# Patient Record
Sex: MALE | Race: WHITE | NOT HISPANIC OR LATINO | URBAN - METROPOLITAN AREA
[De-identification: names, ages, dates, MRNs, and addresses within clinical notes are randomized per-mention and may not be internally consistent; named-entity substitution may affect disease eponyms.]

---

## 2017-06-20 ENCOUNTER — EMERGENCY (EMERGENCY)
Facility: HOSPITAL | Age: 47
LOS: 0 days | Discharge: HOME | End: 2017-06-20
Admitting: INTERNAL MEDICINE

## 2017-06-28 DIAGNOSIS — K08.89 OTHER SPECIFIED DISORDERS OF TEETH AND SUPPORTING STRUCTURES: ICD-10-CM

## 2017-06-28 DIAGNOSIS — Z98.890 OTHER SPECIFIED POSTPROCEDURAL STATES: ICD-10-CM

## 2017-06-28 DIAGNOSIS — Z88.0 ALLERGY STATUS TO PENICILLIN: ICD-10-CM

## 2017-06-29 ENCOUNTER — TRANSCRIPTION ENCOUNTER (OUTPATIENT)
Age: 47
End: 2017-06-29

## 2017-11-01 ENCOUNTER — OUTPATIENT (OUTPATIENT)
Dept: OUTPATIENT SERVICES | Facility: HOSPITAL | Age: 47
LOS: 1 days | Discharge: HOME | End: 2017-11-01

## 2017-11-01 DIAGNOSIS — R74.0 NONSPECIFIC ELEVATION OF LEVELS OF TRANSAMINASE AND LACTIC ACID DEHYDROGENASE [LDH]: ICD-10-CM

## 2017-11-01 DIAGNOSIS — E78.00 PURE HYPERCHOLESTEROLEMIA, UNSPECIFIED: ICD-10-CM

## 2017-11-01 DIAGNOSIS — E55.9 VITAMIN D DEFICIENCY, UNSPECIFIED: ICD-10-CM

## 2017-11-01 DIAGNOSIS — G93.3 POSTVIRAL AND RELATED FATIGUE SYNDROMES: ICD-10-CM

## 2017-11-01 DIAGNOSIS — N39.0 URINARY TRACT INFECTION, SITE NOT SPECIFIED: ICD-10-CM

## 2017-11-01 DIAGNOSIS — E11.9 TYPE 2 DIABETES MELLITUS WITHOUT COMPLICATIONS: ICD-10-CM

## 2018-02-07 ENCOUNTER — OUTPATIENT (OUTPATIENT)
Dept: OUTPATIENT SERVICES | Facility: HOSPITAL | Age: 48
LOS: 1 days | Discharge: HOME | End: 2018-02-07

## 2018-02-07 DIAGNOSIS — E55.9 VITAMIN D DEFICIENCY, UNSPECIFIED: ICD-10-CM

## 2018-02-07 DIAGNOSIS — K76.89 OTHER SPECIFIED DISEASES OF LIVER: ICD-10-CM

## 2018-02-07 DIAGNOSIS — E11.9 TYPE 2 DIABETES MELLITUS WITHOUT COMPLICATIONS: ICD-10-CM

## 2018-02-07 DIAGNOSIS — N39.0 URINARY TRACT INFECTION, SITE NOT SPECIFIED: ICD-10-CM

## 2018-02-07 DIAGNOSIS — D64.9 ANEMIA, UNSPECIFIED: ICD-10-CM

## 2018-02-07 DIAGNOSIS — E78.00 PURE HYPERCHOLESTEROLEMIA, UNSPECIFIED: ICD-10-CM

## 2018-02-07 DIAGNOSIS — N18.2 CHRONIC KIDNEY DISEASE, STAGE 2 (MILD): ICD-10-CM

## 2018-03-03 ENCOUNTER — OUTPATIENT (OUTPATIENT)
Dept: OUTPATIENT SERVICES | Facility: HOSPITAL | Age: 48
LOS: 1 days | Discharge: HOME | End: 2018-03-03

## 2018-03-03 DIAGNOSIS — N18.9 CHRONIC KIDNEY DISEASE, UNSPECIFIED: ICD-10-CM

## 2018-03-04 ENCOUNTER — TRANSCRIPTION ENCOUNTER (OUTPATIENT)
Age: 48
End: 2018-03-04

## 2018-07-03 ENCOUNTER — TRANSCRIPTION ENCOUNTER (OUTPATIENT)
Age: 48
End: 2018-07-03

## 2018-07-05 ENCOUNTER — EMERGENCY (EMERGENCY)
Facility: HOSPITAL | Age: 48
LOS: 0 days | Discharge: HOME | End: 2018-07-05
Attending: EMERGENCY MEDICINE | Admitting: EMERGENCY MEDICINE

## 2018-07-05 VITALS
RESPIRATION RATE: 17 BRPM | WEIGHT: 199.96 LBS | DIASTOLIC BLOOD PRESSURE: 95 MMHG | TEMPERATURE: 97 F | HEIGHT: 70 IN | SYSTOLIC BLOOD PRESSURE: 141 MMHG | HEART RATE: 89 BPM | OXYGEN SATURATION: 99 %

## 2018-07-05 DIAGNOSIS — L25.9 UNSPECIFIED CONTACT DERMATITIS, UNSPECIFIED CAUSE: ICD-10-CM

## 2018-07-05 RX ORDER — DEXAMETHASONE 0.5 MG/5ML
10 ELIXIR ORAL ONCE
Qty: 0 | Refills: 0 | Status: COMPLETED | OUTPATIENT
Start: 2018-07-05 | End: 2018-07-05

## 2018-07-05 RX ADMIN — Medication 10 MILLIGRAM(S): at 16:14

## 2018-07-05 NOTE — ED PROVIDER NOTE - OBJECTIVE STATEMENT
49 y/o male presents with rash generalized since Monday  . patient exposed to poison ivy at work. patient tx by PMD and given prednisone. no SOB, cp. patient with rash to face with all new areas daily.

## 2018-07-05 NOTE — ED PROVIDER NOTE - MEDICAL DECISION MAKING DETAILS
I have personally performed a history and physical exam on this patient and personally directed the management of the patient. 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 he denies any other fevers or chills, it is pruritic in nature worsening noted on face, trunk, arms back and groin. Patient is taking po prednisone. we administered Im prednisone and will discharge with follow counseled rash care.

## 2018-07-05 NOTE — ED PROVIDER NOTE - NS ED ROS FT
Review of Systems    Constitutional: (-) fever or chills  respiratory: (-) cough (-) shortness of breath  Cardiovascular: (-) syncope, palpitations or chest pain  Integumentary: (+) rash or painful lymph nodes  Neurological: (-) altered mental status, headache or head injury

## 2018-12-14 ENCOUNTER — OUTPATIENT (OUTPATIENT)
Dept: OUTPATIENT SERVICES | Facility: HOSPITAL | Age: 48
LOS: 1 days | Discharge: HOME | End: 2018-12-14

## 2018-12-14 DIAGNOSIS — E78.00 PURE HYPERCHOLESTEROLEMIA, UNSPECIFIED: ICD-10-CM

## 2018-12-14 DIAGNOSIS — N18.2 CHRONIC KIDNEY DISEASE, STAGE 2 (MILD): ICD-10-CM

## 2018-12-14 DIAGNOSIS — E11.9 TYPE 2 DIABETES MELLITUS WITHOUT COMPLICATIONS: ICD-10-CM

## 2018-12-14 DIAGNOSIS — N39.0 URINARY TRACT INFECTION, SITE NOT SPECIFIED: ICD-10-CM

## 2018-12-14 DIAGNOSIS — D64.9 ANEMIA, UNSPECIFIED: ICD-10-CM

## 2018-12-14 PROBLEM — Z98.890 OTHER SPECIFIED POSTPROCEDURAL STATES: Chronic | Status: ACTIVE | Noted: 2018-07-05

## 2018-12-14 PROBLEM — K46.9 UNSPECIFIED ABDOMINAL HERNIA WITHOUT OBSTRUCTION OR GANGRENE: Chronic | Status: ACTIVE | Noted: 2018-07-05

## 2019-06-08 ENCOUNTER — OUTPATIENT (OUTPATIENT)
Dept: OUTPATIENT SERVICES | Facility: HOSPITAL | Age: 49
LOS: 1 days | Discharge: HOME | End: 2019-06-08

## 2019-06-08 DIAGNOSIS — D64.9 ANEMIA, UNSPECIFIED: ICD-10-CM

## 2019-06-08 DIAGNOSIS — E78.00 PURE HYPERCHOLESTEROLEMIA, UNSPECIFIED: ICD-10-CM

## 2019-06-08 DIAGNOSIS — N18.2 CHRONIC KIDNEY DISEASE, STAGE 2 (MILD): ICD-10-CM

## 2019-06-08 DIAGNOSIS — E11.9 TYPE 2 DIABETES MELLITUS WITHOUT COMPLICATIONS: ICD-10-CM

## 2019-06-19 PROBLEM — Z00.00 ENCOUNTER FOR PREVENTIVE HEALTH EXAMINATION: Status: ACTIVE | Noted: 2019-06-19

## 2019-08-06 ENCOUNTER — APPOINTMENT (OUTPATIENT)
Dept: SURGERY | Facility: CLINIC | Age: 49
End: 2019-08-06

## 2019-09-14 ENCOUNTER — TRANSCRIPTION ENCOUNTER (OUTPATIENT)
Age: 49
End: 2019-09-14

## 2019-11-09 ENCOUNTER — OUTPATIENT (OUTPATIENT)
Dept: OUTPATIENT SERVICES | Facility: HOSPITAL | Age: 49
LOS: 1 days | Discharge: HOME | End: 2019-11-09

## 2019-11-09 DIAGNOSIS — E11.9 TYPE 2 DIABETES MELLITUS WITHOUT COMPLICATIONS: ICD-10-CM

## 2019-11-09 DIAGNOSIS — N18.2 CHRONIC KIDNEY DISEASE, STAGE 2 (MILD): ICD-10-CM

## 2019-11-09 DIAGNOSIS — D64.9 ANEMIA, UNSPECIFIED: ICD-10-CM

## 2019-11-09 DIAGNOSIS — E78.00 PURE HYPERCHOLESTEROLEMIA, UNSPECIFIED: ICD-10-CM

## 2020-02-06 ENCOUNTER — TRANSCRIPTION ENCOUNTER (OUTPATIENT)
Age: 50
End: 2020-02-06

## 2020-02-17 ENCOUNTER — TRANSCRIPTION ENCOUNTER (OUTPATIENT)
Age: 50
End: 2020-02-17

## 2021-04-11 ENCOUNTER — OUTPATIENT (OUTPATIENT)
Dept: OUTPATIENT SERVICES | Facility: HOSPITAL | Age: 51
LOS: 1 days | Discharge: HOME | End: 2021-04-11

## 2021-04-11 DIAGNOSIS — Z11.59 ENCOUNTER FOR SCREENING FOR OTHER VIRAL DISEASES: ICD-10-CM

## 2021-04-14 ENCOUNTER — OUTPATIENT (OUTPATIENT)
Dept: OUTPATIENT SERVICES | Facility: HOSPITAL | Age: 51
LOS: 1 days | Discharge: HOME | End: 2021-04-14

## 2021-04-14 VITALS
SYSTOLIC BLOOD PRESSURE: 122 MMHG | OXYGEN SATURATION: 96 % | DIASTOLIC BLOOD PRESSURE: 69 MMHG | WEIGHT: 195.11 LBS | TEMPERATURE: 98 F | HEART RATE: 69 BPM | HEIGHT: 70 IN | RESPIRATION RATE: 18 BRPM

## 2021-04-14 VITALS
SYSTOLIC BLOOD PRESSURE: 155 MMHG | HEART RATE: 68 BPM | OXYGEN SATURATION: 97 % | TEMPERATURE: 98 F | DIASTOLIC BLOOD PRESSURE: 69 MMHG | RESPIRATION RATE: 18 BRPM

## 2021-04-14 DIAGNOSIS — Z98.890 OTHER SPECIFIED POSTPROCEDURAL STATES: Chronic | ICD-10-CM

## 2021-04-14 RX ORDER — ESCITALOPRAM OXALATE 10 MG/1
1 TABLET, FILM COATED ORAL
Qty: 0 | Refills: 0 | DISCHARGE

## 2021-04-14 RX ORDER — IBUPROFEN 200 MG
1 TABLET ORAL
Qty: 20 | Refills: 0
Start: 2021-04-14

## 2021-04-14 RX ORDER — EMPAGLIFLOZIN 10 MG/1
1 TABLET, FILM COATED ORAL
Qty: 0 | Refills: 0 | DISCHARGE

## 2021-04-14 NOTE — ASU DISCHARGE PLAN (ADULT/PEDIATRIC) - ASU DC SPECIAL INSTRUCTIONSFT

## 2021-04-23 DIAGNOSIS — G56.02 CARPAL TUNNEL SYNDROME, LEFT UPPER LIMB: ICD-10-CM

## 2021-04-23 DIAGNOSIS — Z87.891 PERSONAL HISTORY OF NICOTINE DEPENDENCE: ICD-10-CM

## 2021-09-07 ENCOUNTER — TRANSCRIPTION ENCOUNTER (OUTPATIENT)
Age: 51
End: 2021-09-07

## 2021-11-02 PROBLEM — E11.9 TYPE 2 DIABETES MELLITUS WITHOUT COMPLICATIONS: Chronic | Status: ACTIVE | Noted: 2021-04-14

## 2021-11-12 ENCOUNTER — APPOINTMENT (OUTPATIENT)
Dept: SURGERY | Facility: CLINIC | Age: 51
End: 2021-11-12
Payer: COMMERCIAL

## 2021-11-12 ENCOUNTER — RESULT REVIEW (OUTPATIENT)
Age: 51
End: 2021-11-12

## 2021-11-12 VITALS
BODY MASS INDEX: 27.2 KG/M2 | DIASTOLIC BLOOD PRESSURE: 70 MMHG | HEART RATE: 78 BPM | OXYGEN SATURATION: 98 % | SYSTOLIC BLOOD PRESSURE: 112 MMHG | HEIGHT: 70 IN | TEMPERATURE: 97.7 F | WEIGHT: 190 LBS

## 2021-11-12 PROCEDURE — 99204 OFFICE O/P NEW MOD 45 MIN: CPT

## 2021-11-12 NOTE — ASSESSMENT
[FreeTextEntry1] : Mr. SAÚL ARMENTA is a 51 year  year old man. He presents to the office on 11/12/2021 , his primary is Unable to Collect PCP .\par \par had b/l open inguinal hernia in 2012- with mesh \par has been having pain since then on the right side. \par point tenderness just below the scar and numbness beyond it. \par \par exam : right groin point tenderness just below the scar and numbness beyond it. \par b/l groin scar\par \par \par impression : nerve entrapment\par \par chronic groin pain after hernia repair\par likely from nerve entrapment\par \par We explained in great detail the pathophysiology of the disease process. We discussed the workup for diagnosis and management. The Post operative care was explained to the patient. He was counselled on diet , exercise and wound care. The Procedure was explained to the patient. The Risk , benefit and alternatives were discussed. We discussed recovery and possible complications. We discussed recurrence rate and complications including chronic abdominal pain. We also discussed hernia, surgery and  pain in relation to him  Job.\par \par \par will get ct to evaluate anamtomy\par will get pain management for nerve block. \par \par We discussed the importance of close follow up. \par We informed that he needs to follow up after CT \par We also informed that he can call us if anything changes or has any questions.

## 2021-11-12 NOTE — PHYSICAL EXAM
[Respiratory Effort] : normal respiratory effort [Alert] : alert [Calm] : calm [JVD] : no jugular venous distention  [Purpura] : no purpura  [de-identified] : Normal  [de-identified] : Normal  [de-identified] : right groin point tenderness just below the scar and numbness beyond it. \par b/l groin scar

## 2021-11-12 NOTE — HISTORY OF PRESENT ILLNESS
[de-identified] : Mr. SAÚL ARMENTA is a 51 year  year old man. He presents to the office on 11/12/2021 , his primary is Unable to Collect PCP .\par \par had b/l open inguinal hernia in 2012- with mesh \par has been having pain since then on the right side. \par point tenderness just below the scar and numbness beyond it. \par

## 2021-11-22 ENCOUNTER — NON-APPOINTMENT (OUTPATIENT)
Age: 51
End: 2021-11-22

## 2021-12-08 ENCOUNTER — OUTPATIENT (OUTPATIENT)
Dept: OUTPATIENT SERVICES | Facility: HOSPITAL | Age: 51
LOS: 1 days | Discharge: HOME | End: 2021-12-08
Payer: COMMERCIAL

## 2021-12-08 DIAGNOSIS — R10.30 LOWER ABDOMINAL PAIN, UNSPECIFIED: ICD-10-CM

## 2021-12-08 DIAGNOSIS — Z98.890 OTHER SPECIFIED POSTPROCEDURAL STATES: Chronic | ICD-10-CM

## 2021-12-08 PROCEDURE — 72193 CT PELVIS W/DYE: CPT | Mod: 26

## 2022-01-12 ENCOUNTER — APPOINTMENT (OUTPATIENT)
Dept: SURGERY | Facility: HOSPITAL | Age: 52
End: 2022-01-12

## 2022-01-12 ENCOUNTER — APPOINTMENT (OUTPATIENT)
Dept: SURGERY | Facility: CLINIC | Age: 52
End: 2022-01-12
Payer: COMMERCIAL

## 2022-01-12 DIAGNOSIS — R10.30 LOWER ABDOMINAL PAIN, UNSPECIFIED: ICD-10-CM

## 2022-01-12 DIAGNOSIS — G89.29 LOWER ABDOMINAL PAIN, UNSPECIFIED: ICD-10-CM

## 2022-01-12 PROCEDURE — 99441: CPT

## 2022-01-12 NOTE — HISTORY OF PRESENT ILLNESS
[de-identified] : Mr. SAÚL ARMENTA is a 51 year  year old man. He presents to the office on 11/12/2021 , his primary is Unable to Collect PCP .\par \par had b/l open inguinal hernia in 2012- with mesh \par has been having pain since then on the right side. \par point tenderness just below the scar and numbness beyond it. \par \par 1/12: telephonic visit. \par he still has groin pain - the ct scan reviewed - no hernia no abnormality in the groin \par - does show bladder thickening. \par

## 2022-01-12 NOTE — ASSESSMENT
[FreeTextEntry1] : Mr. SAÚL ARMENTA is a 51 year  year old man. He presents to the office on 11/12/2021 , his primary is Unable to Collect PCP .\par \par had b/l open inguinal hernia in 2012- with mesh \par has been having pain since then on the right side. \par point tenderness just below the scar and numbness beyond it. \par \par 1/12: telephonic visit. \par he still has groin pain - the ct scan reviewed - no hernia no abnormality in the groin \par - does show bladder thickening. \par \par \par exam : right groin point tenderness just below the scar and numbness beyond it. \par b/l groin scar\par \par \par impression : nerve entrapment\par \par chronic groin pain after hernia repair\par likely from nerve entrapment\par - offered - ir / pain management neuralysis\par - he does not want it now - will think about it\par bladder thickening \par - discussed possiblity of cancer\par - urology referral given - phone no and address\par \par We explained in great detail the pathophysiology of the disease process. We discussed the workup for diagnosis and management. The Post operative care was explained to the patient. He was counselled on diet , exercise and wound care. The Procedure was explained to the patient. The Risk , benefit and alternatives were discussed. We discussed recovery and possible complications. We discussed recurrence rate and complications including chronic abdominal pain. We also discussed hernia, surgery and  pain in relation to him  Job.\par \par will get pain management for nerve block. \par \par We discussed the importance of close follow up. \par We informed that he needs to follow up with me if the pain is bothering him a lot ,.\par We also informed that he can call us if anything changes or has any questions.

## 2022-01-12 NOTE — PHYSICAL EXAM
[JVD] : no jugular venous distention  [Respiratory Effort] : normal respiratory effort [Purpura] : no purpura  [Alert] : alert [Calm] : calm [de-identified] : Normal  [de-identified] : Normal  [de-identified] : right groin point tenderness just below the scar and numbness beyond it. \par b/l groin scar

## 2022-01-25 ENCOUNTER — APPOINTMENT (OUTPATIENT)
Dept: UROLOGY | Facility: CLINIC | Age: 52
End: 2022-01-25
Payer: COMMERCIAL

## 2022-01-25 VITALS — HEIGHT: 70 IN | BODY MASS INDEX: 26.48 KG/M2 | TEMPERATURE: 98 F | WEIGHT: 185 LBS

## 2022-01-25 DIAGNOSIS — E11.9 TYPE 2 DIABETES MELLITUS W/OUT COMPLICATIONS: ICD-10-CM

## 2022-01-25 DIAGNOSIS — Z78.9 OTHER SPECIFIED HEALTH STATUS: ICD-10-CM

## 2022-01-25 DIAGNOSIS — Z87.891 PERSONAL HISTORY OF NICOTINE DEPENDENCE: ICD-10-CM

## 2022-01-25 DIAGNOSIS — N32.9 BLADDER DISORDER, UNSPECIFIED: ICD-10-CM

## 2022-01-25 DIAGNOSIS — R35.1 NOCTURIA: ICD-10-CM

## 2022-01-25 DIAGNOSIS — R35.0 FREQUENCY OF MICTURITION: ICD-10-CM

## 2022-01-25 DIAGNOSIS — Z63.5 DISRUPTION OF FAMILY BY SEPARATION AND DIVORCE: ICD-10-CM

## 2022-01-25 DIAGNOSIS — Z87.442 PERSONAL HISTORY OF URINARY CALCULI: ICD-10-CM

## 2022-01-25 DIAGNOSIS — Z80.3 FAMILY HISTORY OF MALIGNANT NEOPLASM OF BREAST: ICD-10-CM

## 2022-01-25 PROCEDURE — 99204 OFFICE O/P NEW MOD 45 MIN: CPT | Mod: 25

## 2022-01-25 PROCEDURE — 52000 CYSTOURETHROSCOPY: CPT

## 2022-01-25 SDOH — SOCIAL STABILITY - SOCIAL INSECURITY: DISRUPTION OF FAMILY BY SEPARATION AND DIVORCE: Z63.5

## 2022-01-25 NOTE — HISTORY OF PRESENT ILLNESS
[FreeTextEntry1] : SAÚL ARMENTA is a 51 year old male who presents for consultation for questionable bladder lesion identified incidentally on a CT during a work-up for right groin pain after bilateral inguinal hernia repair 2012.\par \par Patient reports has been having increased urinary frequency nocturia 2 times in the last few months.\par Denies gross hematuria, dysuria or associated symptoms.  Currently being evaluated for groin pain with Dr. Houston.\par \par CT pelvis images visualized November 2021 demonstrating asymmetric bladder wall thickening involving left bladder wall 1.2 cm agree with this findings.\par \par Renal bladder ultrasound 2018 shows no hydronephrosis normal PVR.  No stones.\par \par Denies  PMH including previous recurrent UTIs.  States may have had stones previously but no surgeries\par Family History: No  malignancies\par Social History: Former smoker, works on movie sets\par

## 2022-01-25 NOTE — ASSESSMENT
[FreeTextEntry1] : SAÚL ARMENTA is a 51 year old male who presents for consultation for questionable bladder lesion identified incidentally on a CT during a work-up for right groin pain after bilateral inguinal hernia repair 2012, cystoscopy negative.\par \par Bladder wall thickening was likely bladder fold.\par \par Follow-up urine studies\par Given history nephrolithiasis patient is requesting a renal ultrasound and we will discuss the results next visit\par Watchful waiting of mild lower urinary tract symptoms

## 2022-01-27 ENCOUNTER — NON-APPOINTMENT (OUTPATIENT)
Age: 52
End: 2022-01-27

## 2022-01-27 LAB — BACTERIA UR CULT: NORMAL

## 2022-02-07 ENCOUNTER — NON-APPOINTMENT (OUTPATIENT)
Age: 52
End: 2022-02-07

## 2022-02-07 LAB — URINE CYTOLOGY: NORMAL

## 2022-03-14 ENCOUNTER — APPOINTMENT (OUTPATIENT)
Dept: UROLOGY | Facility: CLINIC | Age: 52
End: 2022-03-14

## 2023-03-07 LAB
ANION GAP SERPL CALC-SCNC: 13 MMOL/L
BUN SERPL-MCNC: 17 MG/DL
CALCIUM SERPL-MCNC: 10.8 MG/DL
CHLORIDE SERPL-SCNC: 100 MMOL/L
CO2 SERPL-SCNC: 27 MMOL/L
CREAT SERPL-MCNC: 0.9 MG/DL
GLUCOSE SERPL-MCNC: 153 MG/DL
POTASSIUM SERPL-SCNC: 4.6 MMOL/L
SODIUM SERPL-SCNC: 140 MMOL/L

## 2023-07-18 ENCOUNTER — FORM ENCOUNTER (OUTPATIENT)
Age: 53
End: 2023-07-18

## 2023-07-18 ENCOUNTER — NON-APPOINTMENT (OUTPATIENT)
Age: 53
End: 2023-07-18

## 2023-07-18 ENCOUNTER — APPOINTMENT (OUTPATIENT)
Dept: ORTHOPEDIC SURGERY | Facility: CLINIC | Age: 53
End: 2023-07-18
Payer: OTHER MISCELLANEOUS

## 2023-07-18 DIAGNOSIS — M25.511 PAIN IN RIGHT SHOULDER: ICD-10-CM

## 2023-07-18 PROCEDURE — 99203 OFFICE O/P NEW LOW 30 MIN: CPT

## 2023-07-19 NOTE — HISTORY OF PRESENT ILLNESS
[de-identified] : Patient is here for evaluation of right shoulder pain\par Affecting quality of life\par Has pain and weakness with loss of rom\par Wakes up at night due to pain\par \par h/o right shoulder arthroscopy and rc repair last yr in [place]\par still sig pain and dysfunction\par \par NAD\par Right shoulder:\par inc healed\par TTP ant GH joint, bicipital groove\par FF 0-100 (passive 125)\par ER 40\par IR L5\par Pain with terminal rom\par Weakness to abduction and ER\par Pos Impingement\par Pos Harrison\par Pos Cross Arm Adduction\par Negative instability\par Positive scapula dyskinesia\par \par Plan\par went over findings\par reviewed previous imaging\par needs a new mri right shoulder as he is still having sig pain, weakness, and loss of rom despite previously undergoing rc repair, and need to assess rc healing and retear\par cont hep and pain control\par not working with temp total disability\par \par

## 2023-07-26 ENCOUNTER — FORM ENCOUNTER (OUTPATIENT)
Age: 53
End: 2023-07-26

## 2024-01-26 NOTE — ED PROVIDER NOTE - PHYSICAL EXAMINATION
skin: +diffuse erythematous rash with linear weeping lesions and swelling and excoriations
Spontaneous, unlabored and symmetrical